# Patient Record
(demographics unavailable — no encounter records)

---

## 2024-12-11 NOTE — PHYSICAL EXAM
[Fully active, able to carry on all pre-disease performance without restriction] : Status 0 - Fully active, able to carry on all pre-disease performance without restriction [Normal] : affect appropriate [de-identified] : wears glasses

## 2024-12-11 NOTE — CONSULT LETTER
[Dear  ___] : Dear  [unfilled], [Sincerely,] : Sincerely, [Courtesy Letter:] : I had the pleasure of seeing your patient, [unfilled], in my office today. [Referral Closing:] : Thank you very much for seeing this patient.  If you have any questions, please do not hesitate to contact me. [FreeTextEntry3] : Libra Buenrostro, JESSICA, ANP-c

## 2024-12-11 NOTE — REASON FOR VISIT
[Follow-Up Visit] : a follow-up visit for [Blood Count Assessment] : blood count assessment [Time Spent: ____ minutes] : Total time spent using  services: [unfilled] minutes. The patient's primary language is not English thus required  services. [FreeTextEntry2] : anemai [Interpreters_IDNumber] : [id number] [Interpreters_FullName] : Ernestina

## 2024-12-11 NOTE — HISTORY OF PRESENT ILLNESS
[de-identified] : Referred by: Dr. Valadez   KYLEE GUNNDEANALUCIANA presented at age 25 year on 07/29/2024 for evaluation of iron deficiency anemia. Diagnosed with Chron's disease in February 2024, symptoms started November 2023. Currently see's Dr. Valadez for the Chron's and is on Remicade. Hi most recent endo/colo were in Feb. 2024.  Laboratory studies from 6/15/24 reviewed and notable for: HGB= 12.7, HCT= 41.9, WBC= 8.06, Plt= 312, Ferritin= 14, iron %= 5%, serum iron= 20, B12= 616.   HCM: -Endoscopy/ Colonoscopy: 2/204   SH: - Occupation: Unemployed - Living situation: Lives with spouse - Smoking/Etoh/illicit drugs:  Quit smoking in 2 years ago, stopped drinking ETOH 2 years ago, no drugs   FH: Mother- alive age 56, thyroid and kidney disease Father- alive age 56, healthy 2 sisters- 1 sister has epilepsy 2 brothers- healthy 1 daughter- alive and well    [de-identified] : Feels well. Reports he no longer has fatigue and headaches.

## 2024-12-11 NOTE — REVIEW OF SYSTEMS
[Vision Problems] : vision problems [Anxiety] : anxiety [Fever] : no fever [Chills] : no chills [Night Sweats] : no night sweats [Fatigue] : no fatigue [Nosebleeds] : no nosebleeds [Chest Pain] : no chest pain [Palpitations] : no palpitations [Lower Ext Edema] : no lower extremity edema [Shortness Of Breath] : no shortness of breath [Wheezing] : no wheezing [Cough] : no cough [SOB on Exertion] : no shortness of breath during exertion [Abdominal Pain] : no abdominal pain [Vomiting] : no vomiting [Constipation] : no constipation [Dysuria] : no dysuria [Incontinence] : no incontinence [Joint Pain] : no joint pain [Joint Stiffness] : no joint stiffness [Muscle Pain] : no muscle pain [Muscle Weakness] : no muscle weakness [Skin Rash] : no skin rash [Skin Wound] : no skin wound [Dizziness] : no dizziness [Fainting] : no fainting [Difficulty Walking] : no difficulty walking [Insomnia] : no insomnia [Depression] : no depression [Easy Bleeding] : no tendency for easy bleeding [Easy Bruising] : no tendency for easy bruising [Swollen Glands] : no swollen glands [FreeTextEntry3] : wears glasses